# Patient Record
(demographics unavailable — no encounter records)

---

## 2024-11-23 NOTE — HISTORY OF PRESENT ILLNESS
[FreeTextEntry1] : Chief complaint: abdominal pain, gerd   HPI: Patient presents for followup of multiple complex gastrointestinal signs and symptoms. Intermittent dyspepsia, indigestion, acid reflux and epigastric tenderness. There are no exacerbating or ameliorating factors, the abdominal pain is non radiating, associated factors include nausea.   I reviewed multiple reports with the patient including reports of colonoscopy, endoscopy and histopathology  New prescription sent to pharmacy for ppi  Moderate degree of complexity of medical decision making involved in this patient encounter

## 2024-11-23 NOTE — ASSESSMENT
[FreeTextEntry1] : Patient presents for followup of multiple complex gastrointestinal signs and symptoms   i reviewed multiple reports with the patient including reports of colonoscopy, endoscopy and histopathology   All questions were answered to her satisfaction

## 2025-03-21 NOTE — HISTORY OF PRESENT ILLNESS
[de-identified] : Pt comes for FBW and med renewal PT has gained weight to 245  Feels depressed with her weight gain  Interested in mounjaro  HGBA1c in 2024 was 6.5

## 2025-05-21 NOTE — HISTORY OF PRESENT ILLNESS
[de-identified] : Pt comes for FBW and med renewal  Pt has lost about 10 lbs on mounjaro 2.5 mg   Pt has no side effects.

## 2025-05-21 NOTE — REVIEW OF SYSTEMS
[Fever] : no fever [Chills] : no chills [Chest Pain] : no chest pain [Palpitations] : no palpitations [Lower Ext Edema] : no lower extremity edema [Shortness Of Breath] : no shortness of breath [Wheezing] : no wheezing [Cough] : no cough [Abdominal Pain] : no abdominal pain [Nausea] : no nausea [Vomiting] : no vomiting [Heartburn] : no heartburn

## 2025-05-21 NOTE — PHYSICAL EXAM
[No Acute Distress] : no acute distress [Normal Sclera/Conjunctiva] : normal sclera/conjunctiva [Normal Outer Ear/Nose] : the outer ears and nose were normal in appearance [No JVD] : no jugular venous distention [No Respiratory Distress] : no respiratory distress  [No Accessory Muscle Use] : no accessory muscle use [Normal Rate] : normal rate  [Regular Rhythm] : with a regular rhythm [No Edema] : there was no peripheral edema [No Extremity Clubbing/Cyanosis] : no extremity clubbing/cyanosis [Soft] : abdomen soft [Non Tender] : non-tender [Non-distended] : non-distended [Grossly Normal Strength/Tone] : grossly normal strength/tone